# Patient Record
Sex: FEMALE | Race: WHITE | Employment: OTHER | URBAN - METROPOLITAN AREA
[De-identification: names, ages, dates, MRNs, and addresses within clinical notes are randomized per-mention and may not be internally consistent; named-entity substitution may affect disease eponyms.]

---

## 2021-11-19 ENCOUNTER — APPOINTMENT (OUTPATIENT)
Dept: GENERAL RADIOLOGY | Age: 76
DRG: 066 | End: 2021-11-19
Payer: MEDICARE

## 2021-11-19 ENCOUNTER — HOSPITAL ENCOUNTER (INPATIENT)
Age: 76
LOS: 1 days | Discharge: HOME | DRG: 066 | End: 2021-11-22
Attending: EMERGENCY MEDICINE | Admitting: INTERNAL MEDICINE
Payer: MEDICARE

## 2021-11-19 DIAGNOSIS — R20.2 FACIAL PARESTHESIA: ICD-10-CM

## 2021-11-19 DIAGNOSIS — R20.2 ARM PARESTHESIA, LEFT: Primary | ICD-10-CM

## 2021-11-19 LAB
BASOPHILS ABSOLUTE: 0.1 K/UL (ref 0–0.2)
BASOPHILS RELATIVE PERCENT: 0.7 %
EOSINOPHILS ABSOLUTE: 0.2 K/UL (ref 0–0.6)
EOSINOPHILS RELATIVE PERCENT: 3.1 %
HCT VFR BLD CALC: 37.4 % (ref 36–48)
HEMOGLOBIN: 12.9 G/DL (ref 12–16)
LYMPHOCYTES ABSOLUTE: 2.7 K/UL (ref 1–5.1)
LYMPHOCYTES RELATIVE PERCENT: 37.5 %
MCH RBC QN AUTO: 30.6 PG (ref 26–34)
MCHC RBC AUTO-ENTMCNC: 34.4 G/DL (ref 31–36)
MCV RBC AUTO: 88.9 FL (ref 80–100)
MONOCYTES ABSOLUTE: 0.6 K/UL (ref 0–1.3)
MONOCYTES RELATIVE PERCENT: 7.6 %
NEUTROPHILS ABSOLUTE: 3.7 K/UL (ref 1.7–7.7)
NEUTROPHILS RELATIVE PERCENT: 51.1 %
PDW BLD-RTO: 13.7 % (ref 12.4–15.4)
PLATELET # BLD: 256 K/UL (ref 135–450)
PMV BLD AUTO: 8.8 FL (ref 5–10.5)
RBC # BLD: 4.21 M/UL (ref 4–5.2)
WBC # BLD: 7.3 K/UL (ref 4–11)

## 2021-11-19 PROCEDURE — 80053 COMPREHEN METABOLIC PANEL: CPT

## 2021-11-19 PROCEDURE — 36415 COLL VENOUS BLD VENIPUNCTURE: CPT

## 2021-11-19 PROCEDURE — 80061 LIPID PANEL: CPT

## 2021-11-19 PROCEDURE — 83036 HEMOGLOBIN GLYCOSYLATED A1C: CPT

## 2021-11-19 PROCEDURE — 93005 ELECTROCARDIOGRAM TRACING: CPT | Performed by: EMERGENCY MEDICINE

## 2021-11-19 PROCEDURE — 99285 EMERGENCY DEPT VISIT HI MDM: CPT

## 2021-11-19 PROCEDURE — 85610 PROTHROMBIN TIME: CPT

## 2021-11-19 PROCEDURE — 85025 COMPLETE CBC W/AUTO DIFF WBC: CPT

## 2021-11-19 PROCEDURE — 84484 ASSAY OF TROPONIN QUANT: CPT

## 2021-11-19 PROCEDURE — 71045 X-RAY EXAM CHEST 1 VIEW: CPT

## 2021-11-19 ASSESSMENT — ENCOUNTER SYMPTOMS
VOMITING: 0
NAUSEA: 0
SHORTNESS OF BREATH: 0
DIARRHEA: 0
ABDOMINAL PAIN: 0

## 2021-11-20 ENCOUNTER — APPOINTMENT (OUTPATIENT)
Dept: CT IMAGING | Age: 76
DRG: 066 | End: 2021-11-20
Payer: MEDICARE

## 2021-11-20 ENCOUNTER — APPOINTMENT (OUTPATIENT)
Dept: MRI IMAGING | Age: 76
DRG: 066 | End: 2021-11-20
Payer: MEDICARE

## 2021-11-20 PROBLEM — R29.90 STROKE-LIKE SYMPTOMS: Status: ACTIVE | Noted: 2021-11-20

## 2021-11-20 PROBLEM — I63.81 THALAMIC STROKE (HCC): Status: ACTIVE | Noted: 2021-11-20

## 2021-11-20 LAB
A/G RATIO: 1.6 (ref 1.1–2.2)
ALBUMIN SERPL-MCNC: 4.1 G/DL (ref 3.4–5)
ALP BLD-CCNC: 61 U/L (ref 40–129)
ALT SERPL-CCNC: 22 U/L (ref 10–40)
ANION GAP SERPL CALCULATED.3IONS-SCNC: 13 MMOL/L (ref 3–16)
AST SERPL-CCNC: 32 U/L (ref 15–37)
BILIRUB SERPL-MCNC: <0.2 MG/DL (ref 0–1)
BILIRUBIN URINE: NEGATIVE
BLOOD, URINE: NEGATIVE
BUN BLDV-MCNC: 19 MG/DL (ref 7–20)
CALCIUM SERPL-MCNC: 8.9 MG/DL (ref 8.3–10.6)
CHLORIDE BLD-SCNC: 102 MMOL/L (ref 99–110)
CLARITY: CLEAR
CO2: 23 MMOL/L (ref 21–32)
COLOR: YELLOW
CREAT SERPL-MCNC: 1.2 MG/DL (ref 0.6–1.2)
EKG ATRIAL RATE: 67 BPM
EKG DIAGNOSIS: NORMAL
EKG P AXIS: 44 DEGREES
EKG P-R INTERVAL: 150 MS
EKG Q-T INTERVAL: 410 MS
EKG QRS DURATION: 70 MS
EKG QTC CALCULATION (BAZETT): 433 MS
EKG R AXIS: 2 DEGREES
EKG T AXIS: 40 DEGREES
EKG VENTRICULAR RATE: 67 BPM
GFR AFRICAN AMERICAN: 53
GFR NON-AFRICAN AMERICAN: 44
GLUCOSE BLD-MCNC: 96 MG/DL (ref 70–99)
GLUCOSE URINE: NEGATIVE MG/DL
INR BLD: 0.87 (ref 0.88–1.12)
KETONES, URINE: NEGATIVE MG/DL
LEUKOCYTE ESTERASE, URINE: NEGATIVE
MICROSCOPIC EXAMINATION: NORMAL
NITRITE, URINE: NEGATIVE
PH UA: 5.5 (ref 5–8)
POTASSIUM REFLEX MAGNESIUM: 4.8 MMOL/L (ref 3.5–5.1)
PROTEIN UA: NEGATIVE MG/DL
PROTHROMBIN TIME: 9.8 SEC (ref 9.9–12.7)
SODIUM BLD-SCNC: 138 MMOL/L (ref 136–145)
SPECIFIC GRAVITY UA: 1.01 (ref 1–1.03)
TOTAL PROTEIN: 6.7 G/DL (ref 6.4–8.2)
TROPONIN: <0.01 NG/ML
URINE REFLEX TO CULTURE: NORMAL
URINE TYPE: NORMAL
UROBILINOGEN, URINE: 0.2 E.U./DL

## 2021-11-20 PROCEDURE — 97165 OT EVAL LOW COMPLEX 30 MIN: CPT

## 2021-11-20 PROCEDURE — 96372 THER/PROPH/DIAG INJ SC/IM: CPT

## 2021-11-20 PROCEDURE — 99223 1ST HOSP IP/OBS HIGH 75: CPT | Performed by: PSYCHIATRY & NEUROLOGY

## 2021-11-20 PROCEDURE — 70496 CT ANGIOGRAPHY HEAD: CPT

## 2021-11-20 PROCEDURE — 6370000000 HC RX 637 (ALT 250 FOR IP): Performed by: INTERNAL MEDICINE

## 2021-11-20 PROCEDURE — 70551 MRI BRAIN STEM W/O DYE: CPT

## 2021-11-20 PROCEDURE — C8929 TTE W OR WO FOL WCON,DOPPLER: HCPCS

## 2021-11-20 PROCEDURE — G0378 HOSPITAL OBSERVATION PER HR: HCPCS

## 2021-11-20 PROCEDURE — 6360000004 HC RX CONTRAST MEDICATION: Performed by: EMERGENCY MEDICINE

## 2021-11-20 PROCEDURE — 70450 CT HEAD/BRAIN W/O DYE: CPT

## 2021-11-20 PROCEDURE — 97116 GAIT TRAINING THERAPY: CPT

## 2021-11-20 PROCEDURE — 97535 SELF CARE MNGMENT TRAINING: CPT

## 2021-11-20 PROCEDURE — 97161 PT EVAL LOW COMPLEX 20 MIN: CPT

## 2021-11-20 PROCEDURE — 81003 URINALYSIS AUTO W/O SCOPE: CPT

## 2021-11-20 PROCEDURE — 6360000002 HC RX W HCPCS: Performed by: INTERNAL MEDICINE

## 2021-11-20 RX ORDER — ONDANSETRON 2 MG/ML
4 INJECTION INTRAMUSCULAR; INTRAVENOUS EVERY 6 HOURS PRN
Status: DISCONTINUED | OUTPATIENT
Start: 2021-11-20 | End: 2021-11-22 | Stop reason: HOSPADM

## 2021-11-20 RX ORDER — LISINOPRIL 5 MG/1
5 TABLET ORAL DAILY
Status: DISCONTINUED | OUTPATIENT
Start: 2021-11-20 | End: 2021-11-22 | Stop reason: HOSPADM

## 2021-11-20 RX ORDER — ASPIRIN 300 MG/1
300 SUPPOSITORY RECTAL DAILY
Status: DISCONTINUED | OUTPATIENT
Start: 2021-11-20 | End: 2021-11-22 | Stop reason: HOSPADM

## 2021-11-20 RX ORDER — AMITRIPTYLINE HYDROCHLORIDE 10 MG/1
20 TABLET, FILM COATED ORAL NIGHTLY
Status: DISCONTINUED | OUTPATIENT
Start: 2021-11-20 | End: 2021-11-22 | Stop reason: HOSPADM

## 2021-11-20 RX ORDER — POLYETHYLENE GLYCOL 3350 17 G/17G
17 POWDER, FOR SOLUTION ORAL DAILY PRN
Status: DISCONTINUED | OUTPATIENT
Start: 2021-11-20 | End: 2021-11-22 | Stop reason: HOSPADM

## 2021-11-20 RX ORDER — ATORVASTATIN CALCIUM 80 MG/1
80 TABLET, FILM COATED ORAL NIGHTLY
Status: DISCONTINUED | OUTPATIENT
Start: 2021-11-20 | End: 2021-11-22 | Stop reason: HOSPADM

## 2021-11-20 RX ORDER — CYCLOSPORINE 0.5 MG/ML
1 EMULSION OPHTHALMIC 2 TIMES DAILY
COMMUNITY

## 2021-11-20 RX ORDER — ONDANSETRON 4 MG/1
4 TABLET, ORALLY DISINTEGRATING ORAL EVERY 8 HOURS PRN
Status: DISCONTINUED | OUTPATIENT
Start: 2021-11-20 | End: 2021-11-22 | Stop reason: HOSPADM

## 2021-11-20 RX ORDER — PANTOPRAZOLE SODIUM 40 MG/1
40 TABLET, DELAYED RELEASE ORAL DAILY
Status: DISCONTINUED | OUTPATIENT
Start: 2021-11-20 | End: 2021-11-22 | Stop reason: HOSPADM

## 2021-11-20 RX ORDER — CHLORDIAZEPOXIDE HYDROCHLORIDE 5 MG/1
5 CAPSULE, GELATIN COATED ORAL NIGHTLY
COMMUNITY

## 2021-11-20 RX ORDER — ASPIRIN 81 MG/1
81 TABLET ORAL DAILY
Status: DISCONTINUED | OUTPATIENT
Start: 2021-11-20 | End: 2021-11-22 | Stop reason: HOSPADM

## 2021-11-20 RX ORDER — ACETAMINOPHEN 325 MG/1
650 TABLET ORAL EVERY 4 HOURS PRN
Status: DISCONTINUED | OUTPATIENT
Start: 2021-11-20 | End: 2021-11-22 | Stop reason: HOSPADM

## 2021-11-20 RX ORDER — HYDRALAZINE HYDROCHLORIDE 20 MG/ML
10 INJECTION INTRAMUSCULAR; INTRAVENOUS EVERY 6 HOURS PRN
Status: DISCONTINUED | OUTPATIENT
Start: 2021-11-20 | End: 2021-11-22 | Stop reason: HOSPADM

## 2021-11-20 RX ADMIN — LISINOPRIL 5 MG: 5 TABLET ORAL at 08:56

## 2021-11-20 RX ADMIN — ASPIRIN 81 MG: 81 TABLET, COATED ORAL at 08:56

## 2021-11-20 RX ADMIN — AMITRIPTYLINE HYDROCHLORIDE 20 MG: 10 TABLET, FILM COATED ORAL at 20:12

## 2021-11-20 RX ADMIN — ATORVASTATIN CALCIUM 80 MG: 80 TABLET, FILM COATED ORAL at 20:14

## 2021-11-20 RX ADMIN — PANTOPRAZOLE SODIUM 40 MG: 40 TABLET, DELAYED RELEASE ORAL at 08:56

## 2021-11-20 RX ADMIN — IOPAMIDOL 80 ML: 755 INJECTION, SOLUTION INTRAVENOUS at 01:13

## 2021-11-20 RX ADMIN — ENOXAPARIN SODIUM 40 MG: 100 INJECTION SUBCUTANEOUS at 08:56

## 2021-11-20 ASSESSMENT — PAIN SCALES - GENERAL
PAINLEVEL_OUTOF10: 0

## 2021-11-20 NOTE — PROGRESS NOTES
Stroke Admission    I agree as the admission nurse that I have completed a thorough stroke assessment and completed the admission on the patient. ALL assessment areas listed below have been addressed and completed. Presentation: TIA    Handoff assessment completed with MINGO Martin. Current NIHSS 1.     [x]   Education Assessment  [x]   Individualized Stroke/TIA Education template added, including patient specific risk factors: Hypertension  [x]   Individualized Stroke/TIA Care Plan template added  [x]   Bedside swallow screen completed using the [facility] Protocol, and documented PRIOR to any PO meds, food or drink: Pass  [x]   VTE Prophylaxis: SCDs ordered/addressed; SCDs: Off           (As a reminder, ASA, Plavix and TPA are not VTE prophylaxis.)  [x]   Stroke education booklet given, and education initiated with patient and/or caregiver      Nurse eSignature: Electronically signed by Yaritza Crane RN on 11/20/21 at 5:03 AM EST

## 2021-11-20 NOTE — PLAN OF CARE
Problem: Falls - Risk of:  Goal: Will remain free from falls  Outcome: Ongoing   Calls out appropriately. Bed locked in lowest position. Bed alarm on. Call light/belongings within reach. Problem: HEMODYNAMIC STATUS  Goal: Patient has stable vital signs and fluid balance  Outcome: Ongoing  Vitals stable. Problem: ACTIVITY INTOLERANCE/IMPAIRED MOBILITY  Goal: Mobility/activity is maintained at optimum level for patient  Outcome: Ongoing  Able to actively move all extremities. Ambulating at baseline. Problem: COMMUNICATION IMPAIRMENT  Goal: Ability to express needs and understand communication  Outcome: Ongoing  Able to express needs and understand communication.

## 2021-11-20 NOTE — PROGRESS NOTES
Pt is a/o x4. VSS on room air. No acute changes in neuro status. Pt's NIH scoring at a 1 due to decreased sensation to the RUE. Pt endorses numbness and tingling to the left hand - particularly in the middle finger and thumb. Pt has no complaints of discomfort at this time. Voiding adequately per BRP. Tolerating ambulation well - SBA. Tolerating diet and fluids well. All fall precautions in place.

## 2021-11-20 NOTE — PROGRESS NOTES
4 Eyes Admission Assessment     I agree as the admission nurse that 2 RN's have performed a thorough Head to Toe Skin Assessment on the patient. ALL assessment sites listed below have been assessed on admission. Areas assessed by both nurses:   [x]   Head, Face, and Ears   [x]   Shoulders, Back, and Chest  [x]   Arms, Elbows, and Hands   [x]   Coccyx, Sacrum, and Ischium  [x]   Legs, Feet, and Heels        Does the Patient have Skin Breakdown?   No         Tyree Prevention initiated:  No   Wound Care Orders initiated:  No      [facility] nurse consulted for Pressure Injury (Stage 3,4, Unstageable, DTI, NWPT, and Complex wounds) or Tyree score 18 or lower:  No      Nurse 1 eSignature: Electronically signed by Keesha Luis RN on 11/20/21 at 5:03 AM EST    **SHARE this note so that the co-signing nurse is able to place an eSignature**    Nurse 2 eSignature: Electronically signed by Krystian Pak RN on 11/20/21 at 5:03 AM EST

## 2021-11-20 NOTE — ED PROVIDER NOTES
[facility]          ATTENDING PHYSICIAN NOTE       Date of evaluation: 11/19/2021    Chief Complaint     Fatigue (feeling weak all day with sudden onset left arm numbness and left tongue numbness/ started at 9PM )      History of Present Illness     Raúl Oconnor is a 68 y.o. female with a history of hypertension who presents with complaints of left arm numbness accompanied by left face and tongue numbness that started around 9:00 after the patient had been cooking dinner. Symptoms lasted for about an hour and half before dissipating and on arrival here the symptoms are gone. She had no associated chest pain. She denies any current headache but had one earlier today. She had no symptoms in the left lower extremity and no speech or vision difficulties. All symptoms have resolved at this time. The patient did take 3 baby aspirin on the advice of her daughter. Currently she has no complaints other than feeling generalized fatigue. Review of Systems     Review of Systems   Constitutional: Positive for fatigue. Negative for chills and fever. Respiratory: Negative for shortness of breath. Cardiovascular: Negative for chest pain. Gastrointestinal: Negative for abdominal pain, diarrhea, nausea and vomiting. Neurological: Positive for numbness (Left arm and face, resolved) and headaches. Negative for weakness. All other systems reviewed and are negative. Past Medical, Surgical, Family, and Social History     She has a past medical history of Diverticulitis, GERD (gastroesophageal reflux disease), and Hypertension. She has no past surgical history on file. Her family history is not on file. She reports that she quit smoking about 24 years ago. She smoked 0.00 packs per day. She does not have any smokeless tobacco history on file. She reports current alcohol use. She reports that she does not use drugs.     Medications     Previous Medications Left (6a. ): No drift  Motor Leg, Right (6b. ): No drift  Limb Ataxia (7. ): Absent  Sensory (8. ): Normal  Best Language (9. ): No aphasia  Dysarthria (10. ): Normal  Extinction and Inattention (11): No abnormality  Total: 0      Diagnostic Results     EKG   Interpreted by Doug Rivera MD     Rhythm: normal sinus   Rate: normal  Axis: normal  Ectopy: none  Conduction: normal  ST Segments: no acute change  T Waves: no acute change  Q Waves: none    Clinical Impression: normal sinus rhythm with no acute changes/normal EKG      RADIOLOGY:  CTA HEAD NECK W CONTRAST   Final Result      No evidence of flow significant stenosis in the head or neck. CT HEAD WO CONTRAST   Final Result      No acute intracranial hemorrhage or mass effect.          XR CHEST PORTABLE   Final Result   No acute disease                LABS:   Results for orders placed or performed during the hospital encounter of 11/19/21   CBC Auto Differential   Result Value Ref Range    WBC 7.3 4.0 - 11.0 K/uL    RBC 4.21 4.00 - 5.20 M/uL    Hemoglobin 12.9 12.0 - 16.0 g/dL    Hematocrit 37.4 36.0 - 48.0 %    MCV 88.9 80.0 - 100.0 fL    MCH 30.6 26.0 - 34.0 pg    MCHC 34.4 31.0 - 36.0 g/dL    RDW 13.7 12.4 - 15.4 %    Platelets 043 043 - 415 K/uL    MPV 8.8 5.0 - 10.5 fL    Neutrophils % 51.1 %    Lymphocytes % 37.5 %    Monocytes % 7.6 %    Eosinophils % 3.1 %    Basophils % 0.7 %    Neutrophils Absolute 3.7 1.7 - 7.7 K/uL    Lymphocytes Absolute 2.7 1.0 - 5.1 K/uL    Monocytes Absolute 0.6 0.0 - 1.3 K/uL    Eosinophils Absolute 0.2 0.0 - 0.6 K/uL    Basophils Absolute 0.1 0.0 - 0.2 K/uL   Comprehensive Metabolic Panel w/ Reflex to MG   Result Value Ref Range    Sodium 138 136 - 145 mmol/L    Potassium reflex Magnesium 4.8 3.5 - 5.1 mmol/L    Chloride 102 99 - 110 mmol/L    CO2 23 21 - 32 mmol/L    Anion Gap 13 3 - 16    Glucose 96 70 - 99 mg/dL    BUN 19 7 - 20 mg/dL    CREATININE 1.2 0.6 - 1.2 mg/dL    GFR Non- 44 (A) >60    GFR  53 (A) >60    Calcium 8.9 8.3 - 10.6 mg/dL    Total Protein 6.7 6.4 - 8.2 g/dL    Albumin 4.1 3.4 - 5.0 g/dL    Albumin/Globulin Ratio 1.6 1.1 - 2.2    Total Bilirubin <0.2 0.0 - 1.0 mg/dL    Alkaline Phosphatase 61 40 - 129 U/L    ALT 22 10 - 40 U/L    AST 32 15 - 37 U/L   Troponin   Result Value Ref Range    Troponin <0.01 <0.01 ng/mL   Protime-INR   Result Value Ref Range    Protime 9.8 (L) 9.9 - 12.7 sec    INR 0.87 (L) 0.88 - 1.12       RECENT VITALS:  BP: (!) 156/78, Temp: 97.5 °F (36.4 °C), Pulse: 67, Resp: 20, SpO2: 99 %      ED Course     Nursing Notes, Past Medical Hx, Past Surgical Hx, Social Hx, Allergies, and Family Hx were reviewed. The patient was given the following medications:  Orders Placed This Encounter   Medications    iopamidol (ISOVUE-370) 76 % injection 80 mL       CONSULTS:  [address] / ASSESSMENT / Kodi James is a 68 y.o. female presenting with transient left arm and facial numbness that has resolved upon presentation. Patient is a former smoker and has a history of hypertension but no prior history of cardiovascular disease. Patient symptoms are potentially concerning for a unilateral neurologic deficit but given absence of symptoms on presentation and rapid resolution it appears that the patient may have had a TIA and will need further evaluation. Initial evaluation including CT and CTA of the head and neck are negative. Lab work-up is unremarkable.   Despite normal initial evaluation, symptoms will need further evaluation with MRI and patient will be admitted to the hospital.      t-PA NOT given due to the following EXCLUSION CRITERIA (only those checked):  [] Pregnancy  [] Symptoms > 4.5 hours of onset  [] Last known well cannot be accurately determined  [x] Minor or isolated neurological signs  [x] Rapid improvement of stroke symptoms  [] Seizure at the same time of stroke symptoms  [] Active bleeding or acute trauma (fracture)  [] Presentation consistent with acute MI or post-MI pericarditis  [] Known intracranial neoplasm, AV malformation or aneurysm  [] CT evidence of intracranial hemorrhage  [] Any prior history of intracranial hemorrhage  [] Symptoms suggestive of subarachnoid hemorrhage (even if head CT normal)  [] Persistent hypertension (SBP>185 or DBP>110)  [] Glucose < 50 or > 400. [] Bleeding diathesis, including but not limited to:   Platelets < 836,953   -Heparin within 48 hours with PTT > normal range   -Current or recent use of anticoagulants (dabagtran, rivaroxaban, or warfarin with     INR > 1.7)  [] Lumbar puncture in past 7days  [] Arterial puncture at a noncompressible site in past 7 days  [] Major surgery in past 14 days  [] Gastrointestinal or urinary tract hemorrhage in past 21 days  [] Myocardial infarction in past 3 months  [] Stroke, intracranialsurgery or serious head trauma in past 3 months    t-PA given with the following INCLUSION CRITERIA verified (only those checked):  [] Age 25 years or older  [] Clinical diagnosis of ischemic stroke causing measurableneurological deficit  [] Administration of t-PA can be initiated within 4.5 hours of onset of symptoms  [] A patient or family members who understand the potential risks and benefits:   Of every 100 patients treated with tPA:   72 will have the same outcome   28 will have a better outcome   3 will have a worse outcome(with 1 being severely disabled or fatal) due to t-PA    Acute Stroke Core Measures:   Last Known Well: 9pm  NIH Stroke ScaleTotal: 0  t-PA Eligibility: IV t-PA was considered and not given due to violations in inclusion criteria including mild/rapidly resolving deficit    Clinical Impression     1. Arm paresthesia, left    2.  Facial paresthesia        Disposition     DISPOSITION       Nellie Bell MD  11/20/21 2349

## 2021-11-20 NOTE — PROGRESS NOTES
Patient a/o x4. Vitals stable. Denies pain. NIHSS 1- decreased sensation. Tolerating ambulation well SBA. MRI check list faxed and placed on chart. Resting well in room. Call light/belongings within reach.

## 2021-11-20 NOTE — PROGRESS NOTES
Occupational Therapy   Occupational Therapy Initial Assessment and Treatment  Discharge  Date: 2021   Patient Name: Chapis Galloway  MRN: [id number]     : 1945    Date of Service: 2021    Discharge Recommendations:  Chapis Galloway scored a 24/24 on the AM-PAC ADL Inpatient form. At this time, no further OT is recommended upon discharge due to independence. Recommend patient returns to prior setting with prior services. OT Equipment Recommendations  Equipment Needed: No    Assessment   Assessment: Pt reports she is near her functional baseline w/ exception of L hand/finger numbness (not effecting functional engagement). Demonstrates independence w/ ADLs, transfers, and functional mobility. No skilled OT needs indicated. No DME needs. Will sign off. Decision Making: Low Complexity  OT Education: OT Role; Plan of Care  No Skilled OT: Independent with ADL's; No OT goals identified  REQUIRES OT FOLLOW UP: No  Activity Tolerance  Activity Tolerance: Patient Tolerated treatment well  Activity Tolerance: no c/o fatigue; no observed SOB on exertion  Safety Devices  Safety Devices in place: Yes  Type of devices: Left in chair; Nurse notified; Call light within reach; Chair alarm in place (setup w/ lunch tray)           Patient Diagnosis(es): The primary encounter diagnosis was Arm paresthesia, left. A diagnosis of Facial paresthesia was also pertinent to this visit. has a past medical history of Diverticulitis, GERD (gastroesophageal reflux disease), and Hypertension. has no past surgical history on file. Restrictions  Position Activity Restriction  Other position/activity restrictions: up as tolerated    Subjective   General  Chart Reviewed: Yes  Additional Pertinent Hx: 68 y.o. F who presents with complaints of left arm numbness accompanied by left face and tongue numbness. Hospital Course: CTA Head/Neck: neg; MRI Brain: (+) tiny acute ischemic insult in the right thalamus. PMH: HTN, GERD, Diverticulitis. Family / Caregiver Present: No  Referring Practitioner: Dr. Carl Kat  Diagnosis: L Arm Paresthesia    Subjective  Subjective: In bed on entry. Reports symptoms have resolved w/ exception of numbness L fingers. Social/Functional History  Social/Functional History  Lives With: Friend(s) [de-identified] y/o  - \"He is very healthy\")  Type of Home: House  Home Layout: Two level, Able to Live on Main level with bedroom/bathroom, Laundry in basement  Home Access: Stairs to enter with rails  Entrance Stairs - Number of Steps: 4-5 NELSON  Entrance Stairs - Rails: Both  Bathroom Shower/Tub: Tub/Shower unit  Bathroom Toilet: Standard (bathtub for leverage)  Home Equipment:  (owns no DME)  ADL Assistance: Independent  Homemaking Assistance: Independent  Ambulation Assistance: Independent  Transfer Assistance: Independent  Active : Yes  Occupation: Retired          Objective   Vision: Within Functional Limits  Hearing: Within functional limits      Orientation  Overall Orientation Status: Within Normal Limits        Balance  Sitting Balance: Independent  Standing Balance: Independent    Functional Mobility  Functional - Mobility Device: No device  Activity: Other; To/from bathroom (mobility in hallway)  Assist Level: Independent    Toilet Transfers  Toilet - Technique: Ambulating  Equipment Used: Standard toilet  Toilet Transfer: Modified independent    ADL  Feeding: Independent  Grooming: Independent (brushing teeth at sink level, standing)  LE Dressing: Independent     Coordination  Movements Are Fluid And Coordinated: Yes  Quality of Movement Other  Comment: finger to nose intact, finger opposition intact; engaging in bilateral tasks w/o difficulty; accuracy w/ bilateral hand texting       Bed mobility  Scooting: Independent     Transfers  Sit to stand: Independent  Stand to sit:  Independent        Cognition  Overall Cognitive Status: WNL           Sensation  Overall Sensation Status: Impaired (pt reports numbness in L hand/fingers)          LUE AROM (degrees)  LUE AROM : WFL  Left Hand AROM (degrees)  Left Hand AROM: WFL  RUE AROM (degrees)  RUE AROM : WFL  Right Hand AROM (degrees)  Right Hand AROM: WFL  LUE Strength  Gross LUE Strength: WFL  LUE Strength Comment: 5/5  RUE Strength  Gross RUE Strength: WFL  RUE Strength Comment: 5/5               Pt seen by OT for eval and treat. Treatment included: bed mobility, functional transfer, ADL         Plan   Discharge acute OT - no needs. Pt is independent.                                                  AM-PAC Score        AM-[facility] Inpatient Daily Activity Raw Score: 24 (11/20/21 1315)  AM-PAC Inpatient ADL T-Scale Score : 57.54 (11/20/21 1315)  ADL Inpatient CMS 0-100% Score: 0 (11/20/21 1315)  ADL Inpatient CMS G-Code Modifier : CH (11/20/21 1315)              Therapy Time   Individual Concurrent Group Co-treatment   Time In 1242         Time Out 1313         Minutes 31           Timed Code Treatment Minutes:  16 Minutes    Total Treatment Minutes:  125 [facility] OTR/L #[id number]

## 2021-11-20 NOTE — CONSULTS
Neurology Consultation Note      Patient: Baron Walls MRN: [id number]    YOB: 1945  Age: 68 y.o. Sex: female   Unit: [facility] Room/Bed: 0978/6908-58 Location: [address] [place] [address]    Date of Consultation: 11/20/2021  Date of Admission: 11/19/2021 10:45 PM ( LOS: 0 days )  Admitting Physician: Martha Del Rosario    Primary Care Physician: Benjamin Garza MD   Consult Requested By: Krystin Robles MD     Reason for Consult: \"Stroke like symptoms\"    ASSESSMENT & RECOMMENDATIONS     Assessment  - 72yo woman with HTN presents with left face and arm numbness, found to have small right thalamic ischemic stroke  - Stroke in this location is secondary to small vessel ischemic disease, which is also demonstrated on her MRI  - She describes having to wear compression stockings since she was 25years old but presume this is for vascular insufficiency of some sort (vericose veins, etc) as opposed to vascular disease, per se, so do not feel this necessarily puts her at higher risk for stroke, etc  - She does not have any uncontrolled risks for this given that her BP is well-controlled and she has no evidence of diabetes  - Awaiting her lipid panel, but addition of statin is recommended nonetheless, regardless of the LDL value  - Echo is also pending but do not feel like this going to be revealing given the nature of this stroke    Recommendations  - Agree with addition of ASA 81mg daily and high-intensity statin, regardless of LDL value  - Maintain good secondary prevention of stroke measures including SBP < 130 mmHg AND DBP < 90 mmHg at ALL times; HbA1c <7%; and LDL < 70 mg/dL  - If echo unremarkable then no further in-patient neuro workup indicated and will sign off at that time; call with questions    SUBJECTIVE     Chief Complaint:   \"My left face and arm are numb\"    History of Present Illness:  Baron Walls is a 68 y.o. woman with PHx sig for HTN.  Presented to ER with left face/tongue & arm numbness lasting    Per my interview with the patient:  She had the sudden onset of numbness in her left tongue, left face, left arm & hand. She did not appreciate any weakness of her arm nor face. She does not feel that her speech was impaired/slurred. Although she reported in the ER that the symptoms had completely resolved over about an hour, she reports to me that they did not and she still has some numb feeling in her fingertips on her left hand as well as a bit in her left face. Overall, she feels that the symptoms improved, then maybe worsened, and now have begun improving again. She describes to me that she has worn compression stockings since she was 25years old due to vascular issues in her legs. she is unsure what would have precipitated these symptoms, other than the above. she feels that nothing makes them better and nothing makes them worse. she rates the symptoms as severe. she denies any other associated symptoms including no HA, no speech/language difficulties, no swallowing difficulties, no visual changes, no diplopia, no hearing loss, no tinnitus, no vertigo, no imbalance, no light-headedness, no focal weakness, no other sensory changes, no nausea or vomiting. she has never had this problem before. There is no history of this problem or anything similar in her family members. Past Medical History:   has a past medical history of Diverticulitis, GERD (gastroesophageal reflux disease), and Hypertension. Past Surgical History:  History reviewed. No pertinent surgical history. Family History:  Reviewed with patient and/or other family members. No evidence of any potentially significant or related diagnoses in her genetically connected relatives. Social History:  she reports that she quit smoking about 24 years ago. She smoked 0.00 packs per day. She does not have any smokeless tobacco history on file. She reports current alcohol use.  She reports that she does not use intact V1, V2, V3; no facial asymmetry; hearing intact bilaterally; palate elevates symmetrically; SCMs & trapezii intact bilaterally; tongue midline  -Sensory: intact to lt touch throughout  -Motor:   RUE: 5/5, no pronator drift  RLE: 5/5  LUE: 5/5, no pronator drift  LLE: 5/5  -Tone: Normal throughout  -Reflexes: 1+ & symmetric throughout  -Coordination: FNF intact  -Gait & Station: deferred for pt safety  -Other: no adventitious movements noted  Other Systems  -General Appearance: well-developed, well-nourished, no apparent distress  -Neck: supple  -Lungs: breathing unlabored, regular, no audible wheezes  -CV: pulses strong x4 extremities  -Abd: flat  -Extrem: no c/c/e      Imaging: All reports below personally reviewed & actual images reviewed where indicated. Pertinent positives & negatives are addressed in Assessment & Plan section of note  MRI brain without contrast  Images independently reviewed. Agree with findings. --SALONIRIS    1. Tiny acute ischemic insult in the right thalamus. No hemorrhage or mass effect. 2. Mild chronic small vessel ischemic white matter disease. CTA HEAD NECK W CONTRAST  Images independently reviewed. Agree with findings. --SALONIRIS    No evidence of flow significant stenosis in the head or neck. CT HEAD WO CONTRAST  Images independently reviewed. Agree with findings. --KACHORIS    No acute intracranial hemorrhage or mass effect. Other Testing:  -  Echo pending    Laboratory Review: All results below personally reviewed.  Pertinent positives & negatives are addressed in Assessment & Plan section of note  Recent Results (from the past 72 hour(s))   EKG 12 Lead    Collection Time: 11/19/21 10:52 PM   Result Value Ref Range    Ventricular Rate 67 BPM    Atrial Rate 67 BPM    P-R Interval 150 ms    QRS Duration 70 ms    Q-T Interval 410 ms    QTc Calculation (Bazett) 433 ms    P Axis 44 degrees    R Axis 2 degrees    T Axis 40 degrees    Diagnosis       EKG performed in ER and to be interpreted by ER physician. Confirmed by MD, ER ([id number]),  Berenice Gallagher ([id number]) on 11/20/2021 6:52:13 AM   CBC Auto Differential    Collection Time: 11/19/21 11:47 PM   Result Value Ref Range    WBC 7.3 4.0 - 11.0 K/uL    RBC 4.21 4.00 - 5.20 M/uL    Hemoglobin 12.9 12.0 - 16.0 g/dL    Hematocrit 37.4 36.0 - 48.0 %    MCV 88.9 80.0 - 100.0 fL    MCH 30.6 26.0 - 34.0 pg    MCHC 34.4 31.0 - 36.0 g/dL    RDW 13.7 12.4 - 15.4 %    Platelets 917 436 - 123 K/uL    MPV 8.8 5.0 - 10.5 fL    Neutrophils % 51.1 %    Lymphocytes % 37.5 %    Monocytes % 7.6 %    Eosinophils % 3.1 %    Basophils % 0.7 %    Neutrophils Absolute 3.7 1.7 - 7.7 K/uL    Lymphocytes Absolute 2.7 1.0 - 5.1 K/uL    Monocytes Absolute 0.6 0.0 - 1.3 K/uL    Eosinophils Absolute 0.2 0.0 - 0.6 K/uL    Basophils Absolute 0.1 0.0 - 0.2 K/uL   Comprehensive Metabolic Panel w/ Reflex to MG    Collection Time: 11/19/21 11:47 PM   Result Value Ref Range    Sodium 138 136 - 145 mmol/L    Potassium reflex Magnesium 4.8 3.5 - 5.1 mmol/L    Chloride 102 99 - 110 mmol/L    CO2 23 21 - 32 mmol/L    Anion Gap 13 3 - 16    Glucose 96 70 - 99 mg/dL    BUN 19 7 - 20 mg/dL    CREATININE 1.2 0.6 - 1.2 mg/dL    GFR Non- 44 (A) >60    GFR  53 (A) >60    Calcium 8.9 8.3 - 10.6 mg/dL    Total Protein 6.7 6.4 - 8.2 g/dL    Albumin 4.1 3.4 - 5.0 g/dL    Albumin/Globulin Ratio 1.6 1.1 - 2.2    Total Bilirubin <0.2 0.0 - 1.0 mg/dL    Alkaline Phosphatase 61 40 - 129 U/L    ALT 22 10 - 40 U/L    AST 32 15 - 37 U/L   Troponin    Collection Time: 11/19/21 11:47 PM   Result Value Ref Range    Troponin <0.01 <0.01 ng/mL   Protime-INR    Collection Time: 11/19/21 11:47 PM   Result Value Ref Range    Protime 9.8 (L) 9.9 - 12.7 sec    INR 0.87 (L) 0.88 - 1.12       Scheduled Meds:   pantoprazole  40 mg Oral Daily    [Held by provider] lisinopril  5 mg Oral Daily    amitriptyline  20 mg Oral Nightly    enoxaparin  40 mg SubCUTAneous

## 2021-11-20 NOTE — PROGRESS NOTES
Physical Therapy    Facility/Department: [facility] [address]  Initial Assessment/Treatment/Discharge Summary     NAME: Wendee Runner  : 1945  MRN: [id number]    Date of Service: 2021    Discharge Recommendations:    Wendee Runner scored a 24/24 on the AM-PAC short mobility form. At this time, no further PT is recommended upon discharge due to pt performing all functional mobility safely and independently. Recommend patient returns to prior setting with prior services. PT Equipment Recommendations  Equipment Needed: No    Assessment   Assessment: Pt currently very near her reported baseline except for reports of L hand numbness. Pt moving very well independently and safely. Pt planning to d/c home and reports no safety concerns. Pt with no further acute PT needs at this time. Will sign off from PT services. Prognosis: Excellent  Decision Making: Low Complexity  Patient Education: role of PT, use of call light, d/c planning; pt verb understanding  Barriers to Learning: none  REQUIRES PT FOLLOW UP: No  Activity Tolerance  Activity Tolerance: Patient Tolerated treatment well       Patient Diagnosis(es): The primary encounter diagnosis was Arm paresthesia, left. A diagnosis of Facial paresthesia was also pertinent to this visit. has a past medical history of Diverticulitis, GERD (gastroesophageal reflux disease), and Hypertension. has no past surgical history on file. Restrictions  Position Activity Restriction  Other position/activity restrictions: up as tolerated  Vision/Hearing  Vision: Within Functional Limits  Hearing: Within functional limits     Subjective  General  Chart Reviewed: Yes  Additional Pertinent Hx: 68 y.o. female with a history of hypertension who presents with complaints of left arm numbness accompanied by left face and tongue numbness.   Family / Caregiver Present: No  Referring Practitioner: Denise Moreno MD  Follows Commands: Within Functional Limits  Subjective  Subjective: Pt found supine in bed upon arrival and agreeable to therapy. Pain Screening  Patient Currently in Pain: Denies  Pain Assessment  Pain Assessment: 0-10  Vital Signs  Patient Currently in Pain: Denies       Orientation  Orientation  Overall Orientation Status: Within Normal Limits  Social/Functional History  Social/Functional History  Lives With: Friend(s) [de-identified] y/o  - \"He is very healthy\")  Type of Home: House  Home Layout: Two level, Able to Live on Main level with bedroom/bathroom, Laundry in basement  Home Access: Stairs to enter with rails  Entrance Stairs - Number of Steps: 4-5 NELSON  Entrance Stairs - Rails: Both  Bathroom Shower/Tub: Tub/Shower unit  Bathroom Toilet: Standard (bathtub for leverage)  Home Equipment:  (owns no DME)  ADL Assistance: Independent  Homemaking Assistance: Independent  Ambulation Assistance: Independent  Transfer Assistance: Independent  Active : Yes  Occupation: Retired  Cognition        Objective          AROM RLE (degrees)  RLE AROM: WFL  AROM LLE (degrees)  LLE AROM : WFL  Strength RLE  Strength RLE: WFL  Strength LLE  Strength LLE: WFL     Sensation  Overall Sensation Status: Impaired (pt reports numbness in L hand/fingers)  Bed mobility  Supine to Sit: Independent  Transfers  Sit to Stand: Independent  Stand to sit: Independent  Ambulation  Ambulation?: Yes  Ambulation 1  Surface: level tile  Device: No Device  Assistance: Independent  Quality of Gait: moderate sonia, stride length and Amrit. Overall steady with no LOB or near LOB.   Distance: 10'+500'  Stairs/Curb  Stairs?: Yes  Stairs  # Steps : 12  Stairs Height: 6\"  Rails: Right ascending  Curbs: 6\"  Device: No Device  Assistance: Independent     Balance  Posture: Good  Sitting - Static: Good  Sitting - Dynamic: Good  Standing - Static: Good  Standing - Dynamic: Good        Plan   Plan  Times per week: d/c acute PT  Safety Devices  Type of devices: Gait belt, Chair alarm in place, Call light within reach, Left in

## 2021-11-20 NOTE — H&P
History and Physical    Admit Date: 11/19/2021    Patient's PCP: Dr. Italia Price MD     Chief Complaint: Left arm numbness accompanied by left face and tongue numbness      HISTORY OF PRESENT ILLNESS:    This is a very pleasant 68 y.o. female with hypertension, tobacco abuse hx in the past,  who presented with complaints of left arm numbness, left face and tongue numbness. Her symptoms started after the patient had been cooking dinner and lasted for about an hour and half. She took 3 baby aspirin on the advice of her daughter. On arrival to the ED, were almost gone. No palpitations, or chest pain. No headache. No speech or vision difficulties. Currently she has no complaints other than feeling generalized fatigue    In the ED, she was afebrile had satisfactory vital signs. Routine lab work showed no significant abnormality. EKG showed no acute ST or T wave changes. At this time she is sitting out in bed eating breakfast.      Past Medical / Surgical History:    Past Medical History:   Diagnosis Date    Diverticulitis     GERD (gastroesophageal reflux disease)     Hypertension        History reviewed. No pertinent surgical history. Medications Prior to Admission:    No current facility-administered medications on file prior to encounter. Current Outpatient Medications on File Prior to Encounter   Medication Sig Dispense Refill    chlordiazePOXIDE (LIBRIUM) 5 MG capsule Take 5 mg by mouth nightly.  cycloSPORINE (RESTASIS) 0.05 % ophthalmic emulsion Place 1 drop into both eyes 2 times daily      pantoprazole (PROTONIX) 40 MG tablet Take 1 tablet by mouth daily. 30 tablet 3    lisinopril (PRINIVIL;ZESTRIL) 5 MG tablet Take 1 tablet by mouth daily. 30 tablet 3    amitriptyline (ELAVIL) 10 MG tablet Take 20 mg by mouth nightly.  ascorbic acid (VITAMIN C) 500 MG tablet Take 1,000 mg by mouth daily.       vitamin D-3 (CHOLECALCIFEROL) 5000 UNITS TABS Take 5,000 Units by mouth daily. Allergies:  Tetanus toxoids    Social History:   TOBACCO:   reports that she quit smoking about 24 years ago. She smoked 0.00 packs per day. She does not have any smokeless tobacco history on file. ETOH:   reports current alcohol use. Family History:   History reviewed. No pertinent family history. ROS: Review of Systems - Negative except as in HPI. .   All other systems reviewed and are negative. PHYSICAL EXAM:  /62   Pulse 59   Temp 97.4 °F (36.3 °C) (Oral)   Resp 14   Ht 5' 2\" (1.575 m)   Wt 137 lb 2 oz (62.2 kg)   SpO2 95%   BMI 25.08 kg/m²     No results for input(s): POCGLU in the last 72 hours. General appearance: alert, appears stated age and cooperative  Head: Normocephalic, without obvious abnormality, atraumatic  Eyes: conjunctivae/corneas clear. PERRL, EOM's intact. Fundi benign. Neck: no adenopathy, no carotid bruit, no JVD, supple, symmetrical, trachea midline and thyroid not enlarged, symmetric, no tenderness/mass/nodules  Lungs: clear to auscultation bilaterally  Heart: regular rate and rhythm, S1, S2 normal, no murmur, click, rub or gallop  Abdomen: soft, non-tender; bowel sounds normal; no masses,  no organomegaly  Extremities: extremities normal, atraumatic, no cyanosis or edema  Pulses: 2+ and symmetric  Skin: Skin color, texture, turgor normal. No rashes or lesions  Neurologic: Grossly normal    LABS:  Recent Labs     11/19/21 2347   WBC 7.3   HGB 12.9   HCT 37.4                                                                     Recent Labs     11/19/21 2347      K 4.8      CO2 23   BUN 19   CREATININE 1.2   GLUCOSE 96     Recent Labs     11/19/21 2347   AST 32   ALT 22   BILITOT <0.2   ALKPHOS 61     Recent Labs     11/19/21 2347   TROPONINI <0.01     No results for input(s): BNP in the last 72 hours.   No results found for: PHART, HDO4ZFJ, PO2ART  Recent Labs     11/19/21 2347   INR 0.87*     No results for input(s): NITRITE, COLORU, PHUR, LABCAST, WBCUA, RBCUA, MUCUS, TRICHOMONAS, YEAST, BACTERIA, CLARITYU, SPECGRAV, LEUKOCYTESUR, UROBILINOGEN, BILIRUBINUR, BLOODU, GLUCOSEU, AMORPHOUS in the last 72 hours. Invalid input(s): Suad Torres       Assessment & Plan:      68 y.o. female with left arm numbness, left face and tongue numbness. Acute CVA  CT: No acute abnormality  CTA: No large vessel occlusion  MRI: small right thalamic ischemic stroke  Risk factors likely hypertension  She has a long smoking history but quit many years ago  Lipid panel; hemoglobin A1c pending  Echocardiogram pending  Aspirin  High intensity statin  PT OT eval      Hypertension  -Satisfactory BP  -Monitor BPs        The patient and / or the family were informed of the results of any tests, a time was given to answer questions, a plan was proposed and they agreed with plan. Thank you Dr. Jessenia Mattson MD for the opportunity to be involved in this patients care. If you have any questions or concerns please feel free to contact me at [phone].   Full Code       Disposition: Probable discharge home tomorrow, if remains stable    Melvin Ramey MD

## 2021-11-20 NOTE — PLAN OF CARE
Problem: Falls - Risk of:  Goal: Will remain free from falls  Description: Will remain free from falls  11/20/2021 0909 by Zohra Harvey RN  Outcome: Ongoing   All fall precautions in place. Bed locked and in lowest position with alarm on. Overbed table and personal belonings within reach. Call light within reach and patient instructed to use call light for assistance. Non-skid socks on. Problem: HEMODYNAMIC STATUS  Goal: Patient has stable vital signs and fluid balance  11/20/2021 0909 by Zohra Harvey RN  Outcome: Ongoing   VSS on room air. Tolerating fluids well. Voiding adequately per BRP.

## 2021-11-21 PROBLEM — I63.9 ACUTE CEREBROVASCULAR ACCIDENT (CVA) (HCC): Status: ACTIVE | Noted: 2021-11-21

## 2021-11-21 LAB
ANION GAP SERPL CALCULATED.3IONS-SCNC: 10 MMOL/L (ref 3–16)
BASOPHILS ABSOLUTE: 0 K/UL (ref 0–0.2)
BASOPHILS RELATIVE PERCENT: 0.6 %
BUN BLDV-MCNC: 18 MG/DL (ref 7–20)
CALCIUM SERPL-MCNC: 8.8 MG/DL (ref 8.3–10.6)
CHLORIDE BLD-SCNC: 104 MMOL/L (ref 99–110)
CHOLESTEROL, TOTAL: 237 MG/DL (ref 0–199)
CO2: 26 MMOL/L (ref 21–32)
CREAT SERPL-MCNC: 0.7 MG/DL (ref 0.6–1.2)
EOSINOPHILS ABSOLUTE: 0.2 K/UL (ref 0–0.6)
EOSINOPHILS RELATIVE PERCENT: 3.9 %
ESTIMATED AVERAGE GLUCOSE: 111.2 MG/DL
GFR AFRICAN AMERICAN: >60
GFR NON-AFRICAN AMERICAN: >60
GLUCOSE BLD-MCNC: 86 MG/DL (ref 70–99)
HBA1C MFR BLD: 5.5 %
HCT VFR BLD CALC: 39 % (ref 36–48)
HDLC SERPL-MCNC: 63 MG/DL (ref 40–60)
HEMOGLOBIN: 13.1 G/DL (ref 12–16)
LDL CHOLESTEROL CALCULATED: 139 MG/DL
LYMPHOCYTES ABSOLUTE: 2.3 K/UL (ref 1–5.1)
LYMPHOCYTES RELATIVE PERCENT: 43.9 %
MCH RBC QN AUTO: 29.9 PG (ref 26–34)
MCHC RBC AUTO-ENTMCNC: 33.5 G/DL (ref 31–36)
MCV RBC AUTO: 89.3 FL (ref 80–100)
MONOCYTES ABSOLUTE: 0.4 K/UL (ref 0–1.3)
MONOCYTES RELATIVE PERCENT: 7.1 %
NEUTROPHILS ABSOLUTE: 2.3 K/UL (ref 1.7–7.7)
NEUTROPHILS RELATIVE PERCENT: 44.5 %
PDW BLD-RTO: 13.5 % (ref 12.4–15.4)
PLATELET # BLD: 203 K/UL (ref 135–450)
PMV BLD AUTO: 7.7 FL (ref 5–10.5)
POTASSIUM REFLEX MAGNESIUM: 4.5 MMOL/L (ref 3.5–5.1)
RBC # BLD: 4.36 M/UL (ref 4–5.2)
SODIUM BLD-SCNC: 140 MMOL/L (ref 136–145)
TRIGL SERPL-MCNC: 175 MG/DL (ref 0–150)
VLDLC SERPL CALC-MCNC: 35 MG/DL
WBC # BLD: 5.2 K/UL (ref 4–11)

## 2021-11-21 PROCEDURE — G0378 HOSPITAL OBSERVATION PER HR: HCPCS

## 2021-11-21 PROCEDURE — 6370000000 HC RX 637 (ALT 250 FOR IP): Performed by: INTERNAL MEDICINE

## 2021-11-21 PROCEDURE — 1200000000 HC SEMI PRIVATE

## 2021-11-21 PROCEDURE — 85025 COMPLETE CBC W/AUTO DIFF WBC: CPT

## 2021-11-21 PROCEDURE — 36415 COLL VENOUS BLD VENIPUNCTURE: CPT

## 2021-11-21 PROCEDURE — 6360000002 HC RX W HCPCS: Performed by: INTERNAL MEDICINE

## 2021-11-21 PROCEDURE — 80048 BASIC METABOLIC PNL TOTAL CA: CPT

## 2021-11-21 RX ADMIN — ATORVASTATIN CALCIUM 80 MG: 80 TABLET, FILM COATED ORAL at 20:08

## 2021-11-21 RX ADMIN — AMITRIPTYLINE HYDROCHLORIDE 20 MG: 10 TABLET, FILM COATED ORAL at 20:08

## 2021-11-21 RX ADMIN — PANTOPRAZOLE SODIUM 40 MG: 40 TABLET, DELAYED RELEASE ORAL at 06:50

## 2021-11-21 RX ADMIN — ENOXAPARIN SODIUM 40 MG: 100 INJECTION SUBCUTANEOUS at 09:13

## 2021-11-21 RX ADMIN — ASPIRIN 81 MG: 81 TABLET, COATED ORAL at 09:13

## 2021-11-21 ASSESSMENT — PAIN SCALES - GENERAL
PAINLEVEL_OUTOF10: 0
PAINLEVEL_OUTOF10: 0

## 2021-11-21 NOTE — PROGRESS NOTES
nih 1 , continues tro have tingling to left thumb and middle finger , no weakness   Possible dc today

## 2021-11-21 NOTE — PROGRESS NOTES
Progress Note    Admit Date: 11/19/2021    Patient's PCP: Dr. Alisha Stanley MD     Chief Complaint: Left arm numbness accompanied by left face and tongue numbness       68 y.o. female with hypertension, tobacco abuse hx in the past,  who presented with complaints of left arm numbness, left face and tongue numbness. In the ED, she was afebrile had satisfactory vital signs. Routine lab work showed no significant abnormality. EKG showed no acute ST or T wave changes. Interval HPI  Denies new complaints  No chest pain  No fever or chills    No new neurologic signs or symptoms          Medications : Reviewed      Allergies:  Tetanus toxoids      ROS: Review of Systems - Negative except as in HPI. .   All other systems reviewed and are negative. PHYSICAL EXAM:  /73   Pulse 73   Temp 97.8 °F (36.6 °C) (Oral)   Resp 16   Ht 5' 2\" (1.575 m)   Wt 137 lb 2 oz (62.2 kg)   SpO2 92%   BMI 25.08 kg/m²     No results for input(s): POCGLU in the last 72 hours. General appearance: alert, appears stated age and cooperative  Head: Normocephalic, without obvious abnormality, atraumatic  Eyes: conjunctivae/corneas clear. PERRL, EOM's intact. Fundi benign.   Neck: no adenopathy, no carotid bruit, no JVD, supple, symmetrical, trachea midline and thyroid not enlarged, symmetric, no tenderness/mass/nodules  Lungs: clear to auscultation bilaterally  Heart: regular rate and rhythm, S1, S2 normal, no murmur, click, rub or gallop  Abdomen: soft, non-tender; bowel sounds normal; no masses,  no organomegaly  Extremities: extremities normal, atraumatic, no cyanosis or edema  Pulses: 2+ and symmetric  Skin: Skin color, texture, turgor normal. No rashes or lesions  Neurologic: Grossly normal      LABS:  Recent Labs     11/19/21 2347 11/21/21  0634   WBC 7.3 5.2   HGB 12.9 13.1   HCT 37.4 39.0    203                                                                  Recent Labs     11/19/21 2347 11/21/21 1956  140   K 4.8 4.5    104   CO2 23 26   BUN 19 18   CREATININE 1.2 0.7   GLUCOSE 96 86     Recent Labs     11/19/21  2347   AST 32   ALT 22   BILITOT <0.2   ALKPHOS 61     Recent Labs     11/19/21  2347   TROPONINI <0.01     No results for input(s): BNP in the last 72 hours. No results found for: PHART, OHA7IBZ, PO2ART  Recent Labs     11/19/21  2347   INR 0.87*     Recent Labs     11/20/21  1641   COLORU Yellow   PHUR 5.5   CLARITYU Clear   SPECGRAV 1.010   LEUKOCYTESUR Negative   UROBILINOGEN 0.2   BILIRUBINUR Negative   BLOODU Negative   GLUCOSEU Negative          Assessment & Plan:      68 y.o. female with left arm numbness, left face and tongue numbness. Acute CVA,  right thalamic ischemic stroke  CT: No acute abnormality  CTA: No large vessel occlusion  MRI: small right thalamic ischemic stroke  Risk factors likely hypertension  She has a long smoking history but quit many years ago  Lipid panel;   Hemoglobin A1c 5.5  Echocardiogram pending  Started on Aspirin  Started on High intensity statin  PT OT eval       Hypertension  -Satisfactory BP  -Resume lisinopril soon  -Monitor BPs        The patient and / or the family were informed of the results of any tests, a time was given to answer questions, a plan was proposed and they agreed with plan. Full Code       Disposition:   Echocardiogram pending. For discharge home tomorrow, if remains stable.          Jean Mai MD

## 2021-11-21 NOTE — PLAN OF CARE
Problem: Falls - Risk of:  Goal: Will remain free from falls  Description: Will remain free from falls  11/20/2021 2239 by Faustina Cross RN  Outcome: Ongoing  Note: Bed in lowest position, bed alarm and gripper socks on, call light in reach of pt. Problem: HEMODYNAMIC STATUS  Goal: Patient has stable vital signs and fluid balance  11/20/2021 2239 by Faustina Cross RN  Outcome: Ongoing  Note: Monitor pt's vital signs throughout shift. Perform neurovascular assessments and note any changes or abnormalities.

## 2021-11-22 VITALS
RESPIRATION RATE: 17 BRPM | BODY MASS INDEX: 25.23 KG/M2 | HEIGHT: 62 IN | HEART RATE: 76 BPM | OXYGEN SATURATION: 92 % | TEMPERATURE: 98 F | WEIGHT: 137.13 LBS | DIASTOLIC BLOOD PRESSURE: 71 MMHG | SYSTOLIC BLOOD PRESSURE: 116 MMHG

## 2021-11-22 LAB
ANION GAP SERPL CALCULATED.3IONS-SCNC: 11 MMOL/L (ref 3–16)
BASOPHILS ABSOLUTE: 0 K/UL (ref 0–0.2)
BASOPHILS RELATIVE PERCENT: 0.6 %
BUN BLDV-MCNC: 16 MG/DL (ref 7–20)
CALCIUM SERPL-MCNC: 8.7 MG/DL (ref 8.3–10.6)
CHLORIDE BLD-SCNC: 104 MMOL/L (ref 99–110)
CO2: 24 MMOL/L (ref 21–32)
CREAT SERPL-MCNC: 0.7 MG/DL (ref 0.6–1.2)
EOSINOPHILS ABSOLUTE: 0.2 K/UL (ref 0–0.6)
EOSINOPHILS RELATIVE PERCENT: 3.9 %
GFR AFRICAN AMERICAN: >60
GFR NON-AFRICAN AMERICAN: >60
GLUCOSE BLD-MCNC: 87 MG/DL (ref 70–99)
HCT VFR BLD CALC: 40.4 % (ref 36–48)
HEMOGLOBIN: 13.7 G/DL (ref 12–16)
LYMPHOCYTES ABSOLUTE: 2.3 K/UL (ref 1–5.1)
LYMPHOCYTES RELATIVE PERCENT: 44.9 %
MCH RBC QN AUTO: 30.2 PG (ref 26–34)
MCHC RBC AUTO-ENTMCNC: 33.9 G/DL (ref 31–36)
MCV RBC AUTO: 89 FL (ref 80–100)
MONOCYTES ABSOLUTE: 0.4 K/UL (ref 0–1.3)
MONOCYTES RELATIVE PERCENT: 7.1 %
NEUTROPHILS ABSOLUTE: 2.2 K/UL (ref 1.7–7.7)
NEUTROPHILS RELATIVE PERCENT: 43.5 %
PDW BLD-RTO: 13.4 % (ref 12.4–15.4)
PLATELET # BLD: 203 K/UL (ref 135–450)
PMV BLD AUTO: 7.6 FL (ref 5–10.5)
POTASSIUM REFLEX MAGNESIUM: 4.1 MMOL/L (ref 3.5–5.1)
RBC # BLD: 4.54 M/UL (ref 4–5.2)
SODIUM BLD-SCNC: 139 MMOL/L (ref 136–145)
WBC # BLD: 5 K/UL (ref 4–11)

## 2021-11-22 PROCEDURE — 6360000002 HC RX W HCPCS: Performed by: INTERNAL MEDICINE

## 2021-11-22 PROCEDURE — 85025 COMPLETE CBC W/AUTO DIFF WBC: CPT

## 2021-11-22 PROCEDURE — 6370000000 HC RX 637 (ALT 250 FOR IP): Performed by: INTERNAL MEDICINE

## 2021-11-22 PROCEDURE — 36415 COLL VENOUS BLD VENIPUNCTURE: CPT

## 2021-11-22 PROCEDURE — 80048 BASIC METABOLIC PNL TOTAL CA: CPT

## 2021-11-22 RX ORDER — ASPIRIN 81 MG/1
81 TABLET ORAL DAILY
Qty: 30 TABLET | Refills: 3 | COMMUNITY
Start: 2021-11-23

## 2021-11-22 RX ORDER — ATORVASTATIN CALCIUM 80 MG/1
80 TABLET, FILM COATED ORAL NIGHTLY
Qty: 30 TABLET | Refills: 3 | Status: SHIPPED | OUTPATIENT
Start: 2021-11-22

## 2021-11-22 RX ADMIN — ASPIRIN 81 MG: 81 TABLET, COATED ORAL at 09:05

## 2021-11-22 RX ADMIN — PANTOPRAZOLE SODIUM 40 MG: 40 TABLET, DELAYED RELEASE ORAL at 05:58

## 2021-11-22 RX ADMIN — ENOXAPARIN SODIUM 40 MG: 100 INJECTION SUBCUTANEOUS at 09:04

## 2021-11-22 NOTE — DISCHARGE INSTR - COC
Continuity of Care Form    Patient Name: Jak Aponte   :  1945  MRN:  [id number]    Admit date:  2021  Discharge date:  ***    Code Status Order: Full Code   Advance Directives:      Admitting Physician:  Kaley Hair MD  PCP: Joey Zepeda MD    Discharging Nurse: [facility] Unit/Room#: 8496/3445-70  Discharging Unit Phone Number: ***    Emergency Contact:   Extended Emergency Contact Information  Primary Emergency Contact: [address] [facility] Phone: [phone]  Relation: Child  Secondary Emergency Contact: Lynda Bright, [address] [place] [address] Phone: [phone]  Relation: Child    Past Surgical History:  History reviewed. No pertinent surgical history. Immunization History: There is no immunization history on file for this patient.     Active Problems:  Patient Active Problem List   Diagnosis Code    Syncope R55    Chest pain R07.9    Thalamic stroke (HCC) I63.9    Hypertension I10    Former smoker Z87.891    Small vessel disease, cerebrovascular I67.9    Acute cerebrovascular accident (CVA) ([address] I63.9       Isolation/Infection:   Isolation            No Isolation          Patient Infection Status       None to display            Nurse Assessment:  Last Vital Signs: /71   Pulse 76   Temp 98 °F (36.7 °C) (Oral)   Resp 17   Ht 5' 2\" (1.575 m)   Wt 137 lb 2 oz (62.2 kg)   SpO2 92%   BMI 25.08 kg/m²     Last documented pain score (0-10 scale): Pain Level: 0  Last Weight:   Wt Readings from Last 1 Encounters:   21 137 lb 2 oz (62.2 kg)     Mental Status:  {IP PT MENTAL STATUS:}    IV Access:  { TWAN IV ACCESS:655164123}    Nursing Mobility/ADLs:  Walking   {CHP DME GWRT:053078540}  Transfer  {CHP DME VEUS:170522427}  Bathing  {CHP DME WGNM:092423087}  Dressing  {CHP DME PHUX:606915029}  Toileting  {CHP DME TYFZ:849265092}  Feeding  {P DME TZAE:517157370}  Med Admin  {CHP DME QQMI:564875570}  Med Delivery   { TWAN MED Delivery:449385883}    Wound Care Documentation and Therapy:        Elimination:  Continence: Bowel: {YES / PX:13064}  Bladder: {YES / IF:60073}  Urinary Catheter: {Urinary Catheter:347465272}   Colostomy/Ileostomy/Ileal Conduit: {YES / G}       Date of Last BM: ***    Intake/Output Summary (Last 24 hours) at 2021 1150  Last data filed at 2021 0923  Gross per 24 hour   Intake 250 ml   Output    Net 250 ml     No intake/output data recorded.     Safety Concerns:     [address] Safety Concerns:459910968}    Impairments/Disabilities:      [address] Impairments/Disabilities:921902084}    Nutrition Therapy:  Current Nutrition Therapy:   [address] Diet List:634102359}    Routes of Feeding: {CHP DME Other Feedings:715840293}  Liquids: {Slp liquid thickness:33499}  Daily Fluid Restriction: {CHP DME Yes amt example:586372533}  Last Modified Barium Swallow with Video (Video Swallowing Test): {Done Not Done MSBI:861001348}    Treatments at the Time of Hospital Discharge:   Respiratory Treatments: ***  Oxygen Therapy:  {Therapy; copd oxygen:80488}  Ventilator:    { CC Vent AORE:140627284}    Rehab Therapies: {THERAPEUTIC INTERVENTION:1150726647}  Weight Bearing Status/Restrictions: [address] [employer]  Weight Bearin}  Other Medical Equipment (for information only, NOT a DME order):  {EQUIPMENT:987084797}  Other Treatments: ***    Patient's personal belongings (please select all that are sent with patient):  {CHP DME Belongings:911891189}    RN SIGNATURE:  {Esignature:359762490}    CASE MANAGEMENT/SOCIAL WORK SECTION    Inpatient Status Date: 21    Readmission Risk Assessment Score:  Readmission Risk              Risk of Unplanned Readmission:  9           Discharging to Facility/ Agency   Name: Home no services  Address:  Phone:  Fax:    Dialysis Facility (if applicable)   Name:  Address:  Dialysis Schedule:  Phone:  Fax:    / signature: Electronically signed by JAY Kirk LSW on 21 at 11:51 AM EST    PHYSICIAN SECTION    Prognosis: {Prognosis:8587066479}    Condition at Discharge: Emiliana Joe Patient Condition:999267923}    Rehab Potential (if transferring to Rehab): {Prognosis:5707918685}    Recommended Labs or Other Treatments After Discharge: ***    Physician Certification: I certify the above information and transfer of Brandon Escobedo  is necessary for the continuing treatment of the diagnosis listed and that she requires {Admit to Appropriate Level of Care:62428} for {GREATER/LESS:450110793} 30 days.      Update Admission H&P: {CHP DME Changes in RFRUF:965959194}    PHYSICIAN SIGNATURE:  {Esignature:648618181}

## 2021-11-22 NOTE — PLAN OF CARE
Problem: Falls - Risk of:  Goal: Will remain free from falls  Description: Will remain free from falls  Outcome: Ongoing  Note: Bed in lowest position, bed alarm and gripper socks on, call light in reach. Problem: Pain:  Goal: Control of acute pain  Description: Control of acute pain  Outcome: Ongoing  Note: Administer prn medications per MAR. Reposition pt as needed.

## 2021-11-22 NOTE — DISCHARGE SUMMARY
Hospital Medicine Discharge Summary    Patient: David Headley     Gender: female  : 1945   Age: 68 y.o. MRN: [id number]    Admitting Physician: Alejandra Mccormick MD  Discharge Physician: Alycia Leyva DO    Code Status: Full Code     Admit Date: 2021   Discharge Date:  2021     Disposition:  Home     Discharge Diagnoses: Active Hospital Problems    Diagnosis Date Noted    Acute cerebrovascular accident (CVA) ([facility] [address] [I63.9] 2021    Thalamic stroke ([facility] [address] [I63.9] 2021    Hypertension [I10]     Former smoker [Z87.891]     Small vessel disease, cerebrovascular [I67.9]        Outpatient to do list:     1) Follow-up appointments:    Primary care provider    Condition at Discharge: Juanita Grayo Jacob Course:     Acute CVA,  right thalamic ischemic stroke  CT: No acute abnormality  CTA: No large vessel occlusion  MRI: small right thalamic ischemic stroke  Risk factors likely hypertension, HLD  She has a long smoking history but quit many years ago  Lipid panel;   Hemoglobin A1c 5.5  Echocardiogram normal systolic, diastolic, no major valve abnormalities. Started on Aspirin  Started on High intensity statin  Neurology consultation     Hypertension  -Satisfactory BP  -Resume lisinopril  -Monitor BPs outpatient/ambulatory    Additional findings or notes to primary provider:  None at this time    Discharge Medications:   Current Discharge Medication List        Current Discharge Medication List        Current Discharge Medication List      CONTINUE these medications which have NOT CHANGED    Details   chlordiazePOXIDE (LIBRIUM) 5 MG capsule Take 5 mg by mouth nightly. cycloSPORINE (RESTASIS) 0.05 % ophthalmic emulsion Place 1 drop into both eyes 2 times daily      pantoprazole (PROTONIX) 40 MG tablet Take 1 tablet by mouth daily. Qty: 30 tablet, Refills: 3      lisinopril (PRINIVIL;ZESTRIL) 5 MG tablet Take 1 tablet by mouth daily.   Qty: 30 tablet, Refills: 3 amitriptyline (ELAVIL) 10 MG tablet Take 20 mg by mouth nightly. ascorbic acid (VITAMIN C) 500 MG tablet Take 1,000 mg by mouth daily. vitamin D-3 (CHOLECALCIFEROL) 5000 UNITS TABS Take 5,000 Units by mouth daily. Current Discharge Medication List          Discharge ROS:  A complete review of systems was asked and negative. Discharge Exam:    /71   Pulse 76   Temp 98 °F (36.7 °C) (Oral)   Resp 17   Ht 5' 2\" (1.575 m)   Wt 137 lb 2 oz (62.2 kg)   SpO2 92%   BMI 25.08 kg/m²   General appearance:  NAD  HEENT:   Normal cephalic, atraumatic, moist mucous membranes, no oropharyngeal erythema or exudate  Neck: Supple, trachea midline, no anterior cervical or SC LAD  Heart[de-identified] Normal s1/s2, RRR, no murmurs, gallops, or rubs. No leg edema  Lungs:  Clear to auscultation bilaterally, no wheeze, rales or rhonchi, no use of accessory musclesNormal respiratory effort. Clear to auscultation, bilaterally without Rales/Wheezes/Rhonchi. Abdomen: Soft, non-tender, non-distended, bowel sounds present, no masses  Musculoskeletal:  No clubbing, no cyanosis, or edema  Skin: No lesion or masses  Neurologic:  Neurovascularly intact without any focal sensory/motor deficits. Cranial nerves: II-XII intact, grossly non-focal.  Psychiatric:  Alert and oriented, thought content appropriate    Labs:  For convenience and continuity at follow-up the following most recent labs are provided:    Lab Results   Component Value Date    WBC 5.0 11/22/2021    HGB 13.7 11/22/2021    HCT 40.4 11/22/2021    MCV 89.0 11/22/2021     11/22/2021     11/22/2021    K 4.1 11/22/2021     11/22/2021    CO2 24 11/22/2021    BUN 16 11/22/2021    CREATININE 0.7 11/22/2021    CALCIUM 8.7 11/22/2021    ALKPHOS 61 11/19/2021    ALT 22 11/19/2021    AST 32 11/19/2021    BILITOT <0.2 11/19/2021    LABALBU 4.1 11/19/2021    LDLCALC 139 11/19/2021    TRIG 175 11/19/2021     Lab Results   Component Value Date    INR 0.87 (L) 11/19/2021       Radiology:  ECHO Complete With Bubble Study    Result Date: 11/22/2021  Transthoracic Echocardiography Report (TTE)  Demographics   Patient Name       Mandeep Teague   Date of Study      11/20/2021         Gender              Female   Patient Number     [id number]         Date of Birth       1945   Visit Number       [id number]          Age                 68 year(s)   Accession Number   [id number]         Room Number         2676   Corporate ID       [id number]           Sonographer         Tyree Espinal                                                            [facility]   Ordering Physician Angel Scruggs MD        Physician           Carolina Dhillon MD  Procedure Type of Study   TTE procedure:ECHOCARDIOGRAM COMPLETE 2D W DOPPLER W COLOR. Procedure Date Date: 11/20/2021 Start: 10:54 AM Study Location: [facility] - Echo Lab Technical Quality: Limited visualization due to breast augmentation. Additional Indications:stroke like symptoms. Patient Status: Routine Contrast Medium: Bubble Study. Amount - 10 ml Height: 62 inches Weight: 137 pounds BSA: 1.63 m2 BMI: 25.06 kg/m2 BP: 114/62 mmHg  Conclusions   Summary  Left ventricular cavity size is normal. There is mild asymmetric hypertrophy  of the basal septum. Overall left ventricular systolic function appears  normal. No regional wall motion abnormalities are noted. Diastolic filling  parameters suggests normal diastolic function. Estimated pulmonary artery systolic pressure is at 28 mmHg assuming a right  atrial pressure of 3 mmHg. A bubble study was performed and fails to show evidence of shunting. No evidence of significant valvular stenosis or regurgitation present.    Signature   ------------------------------------------------------------------  Electronically signed by Carolina Dhillon MD (Interpreting physician) on 11/22/2021 at 08:46 AM  ------------------------------------------------------------------   Findings   Left Ventricle  Left ventricular cavity size is normal. There is mild asymmetric hypertrophy  of the basal septum. Overall left ventricular systolic function appears  normal. No regional wall motion abnormalities are noted. Diastolic filling  parameters suggests normal diastolic function. Mitral Valve  Mitral annular calcification is present. Mitral valve leaflets appear  structurally normal. Trace mitral regurgitation is present. No evidence of  mitral valve stenosis. Left Atrium  The left atrium is normal in size. Aortic Valve  The aortic valve is structurally normal. The aortic valve appears tricuspid. No evidence of aortic valve regurgitation. No evidence of aortic valve  stenosis. Aorta  The aortic root is normal in size. Right Ventricle  The right ventricle is normal in size and function. TAPSE measures: 1.75 cm. RV S velocity measures: 9.79 cm/s. Tricuspid Valve  Tricuspid valve is structurally normal. Trivial tricuspid regurgitation. No  evidence of tricuspid stenosis. Right Atrium  The right atrial size is normal.   Pulmonic Valve  The pulmonic valve is not well visualized. Trace pulmonic regurgitation  present. No evidence of pulmonic valve stenosis. Pericardial Effusion  No pericardial effusion noted. Pleural Effusion  No pleural effusion. Miscellaneous  IVC size is normal (<2.1cm) and collapses > 50% with respiration consistent  with normal RA pressure (3mmHg). Estimated pulmonary artery systolic  pressure is at 28 mmHg assuming a right atrial pressure of 3 mmHg. A bubble  study was performed and fails to show evidence of shunting.   M-Mode/2D Measurements (cm)   LV Diastolic Dimension: 1.74 cm LV Systolic Dimension: 3.32 cm  LV Septum Diastolic: 7.72 cm  LV PW Diastolic: 7.14 cm        AO Root Dimension: 2.5 cm  RV Diastolic Dimension: 0.21 cm LA Dimension: 2.8 cm LA Area: 12.2 cm2                                  LA volume/Index: 26.8 ml /16 ml/m2  Doppler Measurements   AV Peak Velocity: 134 cm/s     MV Peak E-Wave: 69.4 cm/s  AV Peak Gradient: 7.18 mmHg    MV Peak A-Wave: 103 cm/s                                 MV E/A Ratio: 0.67   TR Velocity:250 cm/s  TR Gradient:25 mmHg  Estimated RAP:3 mmHg  Estimated RVSP: 28 mmHg        MV Deceleration Time: 459 msec  E' Septal Velocity: 6.31 cm/s  E' Lateral Velocity: 5.77 cm/s  E/E' ratio: 12  PV Peak Velocity: 118 cm/s  PV Peak Gradient: 5.57 mmHg   Aortic Valve   Peak Velocity: 134 cm/s  Peak Gradient: 7.18 mmHg  Aorta   Aortic Root: 2.5 cm      CT HEAD WO CONTRAST    Result Date: 11/20/2021  CT HEAD WITHOUT CONTRAST HISTORY: Stroke PROCEDURE: Noncontrast CT the brain performed with 5 mm contiguous sections. Individualized dose optimization technique was used in order to meet ALARA standards for radiation dose reduction. In addition to vendor specific dose reduction algorithms, the  dose reduction techniques vary based on the specific scanner utilized but frequently include automated exposure control, adjustment of the mA and/or kV according to patient size, and use of iterative reconstruction technique. COMPARISON: None FINDINGS: There is no acute hemorrhage or mass effect. The ventricles are normal in size and position. The grey-white matter differentiation is preserved. There is mild white matter disease. No calvarial abnormality is noted. The sinus are pneumatized. The globes and orbits are normal.  No additional abnormalities are seen in the structures of the skull base. No acute intracranial hemorrhage or mass effect. XR CHEST PORTABLE    Result Date: 11/19/2021  Portable chest HISTORY: Stroke. COMPARISON: October 1, 2014. FINDINGS: Cardiac mediastinal contours normal. No consolidation. No pleural effusion or pneumothorax.      No acute disease     CTA HEAD NECK W CONTRAST    Result Date: 11/20/2021  CT angiography of the head and neck with contrast HISTORY: Stroke. COMPARISON:Noncontrast CT the brain performed earlier today. TECHNIQUE: Multidetector CT imaging of the head and neck was performed following administration of intravenous contrast material in the arterial phase. 3-D MPR and MIP images were retrospectively generated at a separate workstation. Individualized dose optimization technique was used in order to meet ALARA standards for radiation dose reduction. In addition to vendor specific dose reduction algorithms, the dose reduction techniques vary based on the specific scanner utilized but frequently include automated exposure control, adjustment of the mA and/or kV according to patient size, and use of iterative reconstruction technique. FINDINGS: There is standard aortic arch anatomy. The imaged subclavian arteries are patent. The origin of the left common carotid artery is normal. The cervical course of the left common carotid artery is unremarkable. The left carotid bulb is normal. The origin of the left internal carotid artery is normal. The left carotid terminus is unremarkable. Posterior communicating artery unremarkable. The anterior cerebral arteries are patent. There is an anterior communicating artery which is unremarkable. The middle cerebral arteries are patent. The origin of the right common carotid artery is normal. The cervical course of the right common carotid artery is unremarkable. The left carotid bulb is normal. The origin of the right internal carotid artery is normal. The right carotid terminus is unremarkable. The origin of the left vertebral artery is normal. The cervical course of the left vertebral artery is unremarkable. The intradural segment of the left vertebral artery is patent. The origin of the right vertebral artery is normal. The cervical course of the right vertebral artery is unremarkable.  The intradural segment of the right vertebral artery is patent. The basilar artery is patent. The posterior cerebral arteries are patent. There is a focal filling defect in the posterior aspect of the superior sagittal sinus best seen on image 443 of series 2 compared to reflect an arachnoid granulation. Additional focal filling defect is identified in the anterior aspect of the superior sagittal sinus, also a likely arachnoid granulation. . No areas of abnormal enhancement are identified within the brain parenchyma. The thyroid, submandibular, and parotid glands are normal. The structures of the aerodigestive tract are intact. No lymphadenopathy is visualized. The upper hemithorax is unremarkable. The  spaces are normal. The globes and orbits are intact. The paranasal sinuses and mastoid air cells are clear. No destructive osseous lesion is identified. No evidence of flow significant stenosis in the head or neck. MRI brain without contrast    Result Date: 11/20/2021  EXAM: MRI BRAIN WO CONTRAST INDICATION: Stroke like symptoms, altered mental status COMPARISON: Head CT dated November 20, 2021 TECHNIQUE: Standard per department protocol without contrast. FINDINGS: Tiny focus of diffusion restriction within the right thalamus. No intracranial hemorrhage. Mild scattered foci of hyperintense T2/FLAIR signal within the periventricular and subcortical white matter. The ventricles and extra-axial spaces are normal in size and configuration. The midline structures including the pituitary gland, optic chiasm, brainstem, pineal gland, corpus callosum, and cerebellar tonsils are normal. The paranasal sinuses are clear. The globes and orbits are normal. Intracranial arterial and venous flow voids are patent. No abnormality of the skull base or calvarium is identified. There is susceptibility artifact in the left face which obscures portions of the susceptibility weighted images. 1. Tiny acute ischemic insult in the right thalamus.  No hemorrhage or mass effect. 2. Mild chronic small vessel ischemic white matter disease. The patient was seen and examined on day of discharge and this discharge summary is in conjunction with any daily progress note from day of discharge. Time Spent on discharge is more than 30 minutes in the examination, evaluation, counseling and review of medications and discharge plan. Lucho Mathis DO   11/22/2021      Thank you Mitch Willard MD for the opportunity to be involved in this patient's care. If you have any questions or concerns please feel free to contact me at [facility].

## 2021-11-22 NOTE — PLAN OF CARE
Problem: Falls - Risk of:  Goal: Will remain free from falls  Description: Will remain free from falls  11/22/2021 0732 by Tawanda Melgar RN  Outcome: Ongoing    Fall risk precaution in place. Bed is locked and in lowest position. 2/4 side rails are up. Call light with in reach. Fall risk bracelet in place, non slip socks on.frequent check on patient. free from falls at this time. will continue to monitor.        Problem: HEMODYNAMIC STATUS  Goal: Patient has stable vital signs and fluid balance  Outcome: Ongoing     Problem: ACTIVITY INTOLERANCE/IMPAIRED MOBILITY  Goal: Mobility/activity is maintained at optimum level for patient  Outcome: Ongoing     Problem: COMMUNICATION IMPAIRMENT  Goal: Ability to express needs and understand communication  Outcome: Ongoing     Problem: Discharge Planning:  Goal: Discharged to appropriate level of care  Description: Discharged to appropriate level of care  Outcome: Ongoing     Problem: Pain:  Goal: Pain level will decrease  Description: Pain level will decrease  Outcome: Ongoing

## 2021-11-22 NOTE — CARE COORDINATION
Case Management Assessment            Discharge Note                    Date / Time of Note: 11/22/2021 11:50 AM                  Discharge Note Completed by: JAY Kirk, IGNACIOW    Patient Name: Alycia Fernando   YOB: 1945  Diagnosis: Arm paresthesia, left [R20.2]  Stroke-like symptoms [R29.90]  Facial paresthesia [R20.2]  Acute cerebrovascular accident (CVA) [facility]) [I63.9]   Date / Time: 11/19/2021 10:45 PM    Current PCP: Landon Farris MD  Clinic patient: No    Hospitalization in the last 30 days: No    Advance Directives:  Code Status: Full Code  [place] DNR form completed and on chart: No    Financial:  Payor: Joyce Rodriguez / Plan: [place] / Product Type: *No Product type* /      Pharmacy:    [facility] [address] [phone] - F [phone]  [address] [place] [address]. [facility] [address]  Phone: [phone] Fax: [phone]      Assistance purchasing medications?: Potential Assistance Purchasing Medications: No  Assistance provided by Case Management: None at this time    Does patient want to participate in local refill/ meds to beds program?: Not Assessed    Meds To Beds General Rules:  1. Can ONLY be done Monday- Friday between 8:30am-5pm  2. Prescription(s) must be in pharmacy by 3pm to be filled same day  3. Copy of patient's insurance/ prescription drug card and patient face sheet must be sent along with the prescription(s)  4. Cost of Rx cannot be added to hospital bill. If financial assistance is needed, please contact unit  or ;  or  CANNOT provide pharmacy voucher for patients co-pays  5.  Patients can then  the prescription on their way out of the hospital at discharge, or pharmacy can deliver to the bedside if staff is available. (payment due at time of pick-up or delivery - cash, check, or card accepted)     Able to afford home medications/ co-pay costs: Yes    ADLS:  Current PT AM-PAC Score: 24 /24  Current OT AM-PAC Score: 24 /24      DISCHARGE Disposition: Home- No Services Needed    LOC at discharge: Not Applicable  TWAN Completed: Yes    Notification completed in HENS/PAS?:  Not Applicable    IMM Completed:   No    Transportation:  Transportation PLAN for discharge: family   Mode of Transport: [facility]  ordered at discharge: No    Durable Medical Equipment:  DME Provider: N/A  Equipment obtained during hospitalization: None    Home Oxygen and Respiratory Equipment:  Oxygen needed at discharge?: No    Dialysis:  Dialysis patient: No    Referrals made at [facility] for outpatient continued care:  Not Applicable    Additional CM Notes: SW met w/Pt at bedside. Pt is from home with friends. Pt plans to return home and will be transported by her sister. Pt is medically cleared for DC and has no other services needs currently. The Plan for Transition of Care is related to the following treatment goals of Arm paresthesia, left [R20.2]  Stroke-like symptoms [R29.90]  Facial paresthesia [R20.2]  Acute cerebrovascular accident (CVA) ([address] [I63.9]    The Patient and/or patient representative Maria C Galvez and her family were provided with a choice of provider and agrees with the discharge plan Yes    Freedom of choice list was provided with basic dialogue that supports the patient's individualized plan of care/goals and shares the quality data associated with the providers.  Yes    Care Transitions patient: No    JAY Jeong, [facility]  Case Management Department  Ph: [phone]

## 2021-11-22 NOTE — PROGRESS NOTES
Pt A&O x4, VSS and neuro status remains unchanged. NIH 1 due to decreased sensation to left fingers. Pt denies any pain this shift. Voiding adequately to bathroom with no issues. Tolerating diet and fluids. Fall precautions in place. Pt asleep in bed with call light in reach.